# Patient Record
Sex: MALE | Race: WHITE | NOT HISPANIC OR LATINO
[De-identification: names, ages, dates, MRNs, and addresses within clinical notes are randomized per-mention and may not be internally consistent; named-entity substitution may affect disease eponyms.]

---

## 2022-01-06 ENCOUNTER — TRANSCRIPTION ENCOUNTER (OUTPATIENT)
Age: 62
End: 2022-01-06

## 2022-10-06 ENCOUNTER — APPOINTMENT (OUTPATIENT)
Dept: ORTHOPEDIC SURGERY | Facility: CLINIC | Age: 62
End: 2022-10-06

## 2022-10-06 VITALS — BODY MASS INDEX: 27.94 KG/M2 | WEIGHT: 178 LBS | HEIGHT: 67 IN

## 2022-10-06 DIAGNOSIS — S83.242A OTHER TEAR OF MEDIAL MENISCUS, CURRENT INJURY, LEFT KNEE, INITIAL ENCOUNTER: ICD-10-CM

## 2022-10-06 DIAGNOSIS — S83.412A SPRAIN OF MEDIAL COLLATERAL LIGAMENT OF LEFT KNEE, INITIAL ENCOUNTER: ICD-10-CM

## 2022-10-06 DIAGNOSIS — M25.462 EFFUSION, LEFT KNEE: ICD-10-CM

## 2022-10-06 DIAGNOSIS — I10 ESSENTIAL (PRIMARY) HYPERTENSION: ICD-10-CM

## 2022-10-06 PROBLEM — Z00.00 ENCOUNTER FOR PREVENTIVE HEALTH EXAMINATION: Status: ACTIVE | Noted: 2022-10-06

## 2022-10-06 PROCEDURE — 99204 OFFICE O/P NEW MOD 45 MIN: CPT | Mod: 25

## 2022-10-06 PROCEDURE — 73564 X-RAY EXAM KNEE 4 OR MORE: CPT | Mod: LT

## 2022-10-06 PROCEDURE — 20611 DRAIN/INJ JOINT/BURSA W/US: CPT

## 2022-10-06 PROCEDURE — J3490M: CUSTOM

## 2022-10-06 NOTE — PHYSICAL EXAM
[Left] : left knee [Equivocal] : equivocal anterior draw [] : ROM is limited secondary to pain [TWNoteComboBox7] : flexion 100 degrees [de-identified] : extension 0 degrees

## 2022-10-06 NOTE — HISTORY OF PRESENT ILLNESS
[9] : 9 [0] : 0 [Dull/Aching] : dull/aching [Localized] : localized [Throbbing] : throbbing [Tightness] : tightness [Leisure] : leisure [Sleep] : sleep [Meds] : meds [Ice] : ice [Walking] : walking [de-identified] : Patient here for further evaluation of his left knee. 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 He states he twisted his knee at the time. He continues to have pain with decreased rom. No previous knee issues. He has been taking advil and using ice.  [] : no [FreeTextEntry3] : 10/2/22 [FreeTextEntry5] : New patient is here for left knee. 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 2 players collided and landed on patient causing his to fall on his knee bended. Patient feels most of his pain on the inner side of the knee and the back of the knee. Pt reports a lot of swelling.    [FreeTextEntry1] : left knee

## 2022-10-06 NOTE — HISTORY OF PRESENT ILLNESS
[9] : 9 [0] : 0 [Dull/Aching] : dull/aching [Localized] : localized [Throbbing] : throbbing [Tightness] : tightness [Leisure] : leisure [Sleep] : sleep [Meds] : meds [Ice] : ice [Walking] : walking [de-identified] : Patient here for further evaluation of his left knee. 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 He states he twisted his knee at the time. He continues to have pain with decreased rom. No previous knee issues. He has been taking advil and using ice.  [] : no [FreeTextEntry3] : 10/2/22 [FreeTextEntry5] : New patient is here for left knee. 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 2 players collided and landed on patient causing his to fall on his knee bended. Patient feels most of his pain on the inner side of the knee and the back of the knee. Pt reports a lot of swelling.    [FreeTextEntry1] : left knee

## 2022-10-06 NOTE — DISCUSSION/SUMMARY
[de-identified] : General Dx Discussion\par The patient was advised of the diagnosis. The natural history of the pathology was explained in full to the patient in layman's terms. All questions were answered. The risks and benefits of surgical and non-surgical treatment alternatives were explained in full to the patient.\par \par Case Discussed.\par Recommend Ice and Mobic. \par CSI today left knee and tolerated well. \par Will get mri left knee for further evaluation of MCL sprain/MMT. \par f/u after mri. \par \par Entered by Bhumi CLIFFORD acting as a scribe.\par Instructions: Dr. Mendes- The documentation recorded by the scribe accurately reflects the service I personally performed and the decisions made by me.\par

## 2022-10-06 NOTE — DISCUSSION/SUMMARY
[de-identified] : General Dx Discussion\par The patient was advised of the diagnosis. The natural history of the pathology was explained in full to the patient in layman's terms. All questions were answered. The risks and benefits of surgical and non-surgical treatment alternatives were explained in full to the patient.\par \par Case Discussed.\par Recommend Ice and Mobic. \par CSI today left knee and tolerated well. \par Will get mri left knee for further evaluation of MCL sprain/MMT. \par f/u after mri. \par \par Entered by Bhumi CLIFFORD acting as a scribe.\par Instructions: Dr. Mendes- The documentation recorded by the scribe accurately reflects the service I personally performed and the decisions made by me.\par

## 2022-10-06 NOTE — PROCEDURE
[Large Joint Injection] : Large joint injection [Left] : of the left [Knee] : knee [Pain] : pain [Inflammation] : inflammation [Repeat series performed] : repeat series performed [Alcohol] : alcohol [Betadine] : betadine [Ethyl Chloride sprayed topically] : ethyl chloride sprayed topically [Sterile technique used] : sterile technique used [___ cc    3mg] :  Betamethasone (Celestone) ~Vcc of 3mg [___ cc    1%] : Lidocaine ~Vcc of 1%  [___ cc    0.25%] : Bupivacaine (Marcaine) ~Vcc of 0.25%  [] : Patient tolerated procedure well [Call if redness, pain or fever occur] : call if redness, pain or fever occur [Apply ice for 15min out of every hour for the next 12-24 hours as tolerated] : apply ice for 15 minutes out of every hour for the next 12-24 hours as tolerated [Previous OTC use and PT nontherapeutic] : patient has tried OTC's including aspirin, Ibuprofen, Aleve, etc or prescription NSAIDS, and/or exercises at home and/or physical therapy without satisfactory response [Patient had decreased mobility in the joint] : patient had decreased mobility in the joint [Risks, benefits, alternatives discussed / Verbal consent obtained] : the risks benefits, and alternatives have been discussed, and verbal consent was obtained [Prior failure or difficult injection] : prior failure or difficult injection [Altered anatomic landmarks d/t erosive arthritis] : altered anatomic landmarks d/t erosive arthritis

## 2022-10-06 NOTE — PHYSICAL EXAM
[Left] : left knee [Equivocal] : equivocal anterior draw [] : no atrophy [TWNoteComboBox7] : flexion 100 degrees [de-identified] : extension 0 degrees

## 2022-10-06 NOTE — IMAGING
[Left] : left knee [All Views] : anteroposterior, lateral, skyline, and anteroposterior standing [There are no fractures, subluxations or dislocations. No significant abnormalities are seen] : There are no fractures, subluxations or dislocations. No significant abnormalities are seen [FreeTextEntry9] : mild pf oa and mild joint space narrowing

## 2022-10-12 ENCOUNTER — FORM ENCOUNTER (OUTPATIENT)
Age: 62
End: 2022-10-12

## 2022-10-13 ENCOUNTER — APPOINTMENT (OUTPATIENT)
Dept: MRI IMAGING | Facility: CLINIC | Age: 62
End: 2022-10-13

## 2022-10-13 PROCEDURE — 73721 MRI JNT OF LWR EXTRE W/O DYE: CPT | Mod: LT

## 2022-10-20 ENCOUNTER — APPOINTMENT (OUTPATIENT)
Dept: ORTHOPEDIC SURGERY | Facility: CLINIC | Age: 62
End: 2022-10-20

## 2022-10-20 VITALS — BODY MASS INDEX: 27.94 KG/M2 | HEIGHT: 67 IN | WEIGHT: 178 LBS

## 2022-10-20 DIAGNOSIS — M23.92 UNSPECIFIED INTERNAL DERANGEMENT OF LEFT KNEE: ICD-10-CM

## 2022-10-20 PROCEDURE — 99213 OFFICE O/P EST LOW 20 MIN: CPT

## 2022-10-23 NOTE — DATA REVIEWED
[MRI] : MRI [Right] : of the right [Knee] : knee [Report was reviewed and noted in the chart] : The report was reviewed and noted in the chart [I reviewed the films/CD] : I reviewed the films/CD

## 2022-10-23 NOTE — DISCUSSION/SUMMARY
[de-identified] : General Dx Discussion\par The patient was advised of the diagnosis. The natural history of the pathology was explained in full to the patient in layman's terms. All questions were answered. The risks and benefits of surgical and non-surgical treatment alternatives were explained in full to the patient.\par \par will get a HKB and do a HEP \par f/u 4 weeks \par surg/non surg options discussed \par questions answered

## 2022-10-23 NOTE — HISTORY OF PRESENT ILLNESS
[3] : 3 [0] : 0 [Dull/Aching] : dull/aching [Intermittent] : intermittent [Full time] : Work status: full time [de-identified] : Patient is here for MRI results of his left knee. Pain has improved. [] : no [FreeTextEntry1] : left knee

## 2022-10-23 NOTE — PHYSICAL EXAM
[Left] : left knee [5___] : hamstring 5[unfilled]/5 [Equivocal] : equivocal posterior draw [] : no atrophy [TWNoteComboBox7] : flexion 115 degrees [de-identified] : extension 0 degrees

## 2022-11-07 ENCOUNTER — RX RENEWAL (OUTPATIENT)
Age: 62
End: 2022-11-07

## 2022-11-07 RX ORDER — MELOXICAM 15 MG/1
15 TABLET ORAL DAILY
Qty: 30 | Refills: 0 | Status: COMPLETED | COMMUNITY
Start: 2022-10-06 | End: 1900-01-01

## 2022-12-01 ENCOUNTER — APPOINTMENT (OUTPATIENT)
Dept: ORTHOPEDIC SURGERY | Facility: CLINIC | Age: 62
End: 2022-12-01

## 2022-12-01 VITALS — HEIGHT: 67 IN | WEIGHT: 178 LBS | BODY MASS INDEX: 27.94 KG/M2

## 2022-12-01 DIAGNOSIS — M17.12 UNILATERAL PRIMARY OSTEOARTHRITIS, LEFT KNEE: ICD-10-CM

## 2022-12-01 DIAGNOSIS — S83.522A SPRAIN OF POSTERIOR CRUCIATE LIGAMENT OF LEFT KNEE, INITIAL ENCOUNTER: ICD-10-CM

## 2022-12-01 DIAGNOSIS — S83.242D OTHER TEAR OF MEDIAL MENISCUS, CURRENT INJURY, LEFT KNEE, SUBSEQUENT ENCOUNTER: ICD-10-CM

## 2022-12-01 DIAGNOSIS — S83.412D SPRAIN OF MEDIAL COLLATERAL LIGAMENT OF LEFT KNEE, SUBSEQUENT ENCOUNTER: ICD-10-CM

## 2022-12-01 PROCEDURE — 99213 OFFICE O/P EST LOW 20 MIN: CPT

## 2022-12-01 NOTE — DISCUSSION/SUMMARY
[de-identified] : General Dx Discussion\par The patient was advised of the diagnosis. The natural history of the pathology was explained in full to the patient in layman's terms. All questions were answered. The risks and benefits of surgical and non-surgical treatment alternatives were explained in full to the patient.\par \par doing well cont HEP and brace when active

## 2022-12-01 NOTE — PHYSICAL EXAM
[Left] : left knee [5___] : hamstring 5[unfilled]/5 [Negative] : negative Sandra's [Equivocal] : equivocal posterior draw [] : patient ambulates without assistive device [TWNoteComboBox7] : flexion 120 degrees [de-identified] : extension 0 degrees

## 2022-12-01 NOTE — HISTORY OF PRESENT ILLNESS
[Full time] : Work status: full time [2] : 2 [0] : 0 [Dull/Aching] : dull/aching [Occasional] : occasional [de-identified] : Patient is here for a follow up on his left knee.Pain has improved.   [] : no [FreeTextEntry1] : left knee [FreeTextEntry9] : Advil [de-identified] : HERBERT

## 2022-12-08 ENCOUNTER — RX RENEWAL (OUTPATIENT)
Age: 62
End: 2022-12-08